# Patient Record
Sex: FEMALE | Race: WHITE | NOT HISPANIC OR LATINO | Employment: FULL TIME | URBAN - METROPOLITAN AREA
[De-identification: names, ages, dates, MRNs, and addresses within clinical notes are randomized per-mention and may not be internally consistent; named-entity substitution may affect disease eponyms.]

---

## 2019-03-20 ENCOUNTER — OFFICE VISIT (OUTPATIENT)
Dept: FAMILY MEDICINE CLINIC | Facility: CLINIC | Age: 34
End: 2019-03-20
Payer: COMMERCIAL

## 2019-03-20 VITALS
WEIGHT: 125.4 LBS | HEART RATE: 88 BPM | HEIGHT: 61 IN | DIASTOLIC BLOOD PRESSURE: 76 MMHG | BODY MASS INDEX: 23.68 KG/M2 | SYSTOLIC BLOOD PRESSURE: 128 MMHG

## 2019-03-20 DIAGNOSIS — M79.10 MYALGIA: ICD-10-CM

## 2019-03-20 DIAGNOSIS — M54.2 NECK PAIN: Primary | ICD-10-CM

## 2019-03-20 DIAGNOSIS — M25.50 ARTHRALGIA, UNSPECIFIED JOINT: ICD-10-CM

## 2019-03-20 DIAGNOSIS — R22.1 NECK MASS: ICD-10-CM

## 2019-03-20 DIAGNOSIS — Z30.41 ORAL CONTRACEPTIVE USE: ICD-10-CM

## 2019-03-20 DIAGNOSIS — Z00.00 HEALTH CARE MAINTENANCE: ICD-10-CM

## 2019-03-20 DIAGNOSIS — Z13.220 SCREENING FOR LIPID DISORDERS: ICD-10-CM

## 2019-03-20 PROCEDURE — 1036F TOBACCO NON-USER: CPT | Performed by: FAMILY MEDICINE

## 2019-03-20 PROCEDURE — 99204 OFFICE O/P NEW MOD 45 MIN: CPT | Performed by: FAMILY MEDICINE

## 2019-03-20 PROCEDURE — 3008F BODY MASS INDEX DOCD: CPT | Performed by: FAMILY MEDICINE

## 2019-03-20 RX ORDER — NORGESTIMATE AND ETHINYL ESTRADIOL 7DAYSX3 28
1 KIT ORAL
COMMUNITY
Start: 2019-01-14

## 2019-03-20 NOTE — PROGRESS NOTES
Assessment and Plan:  1  Neck pain 2  Neck mass, I favor this feels like the normal C7 prominence, x-rays ordered physical therapy is ordered patient use Aleve 1 twice a day and use ice 15 minutes 3 to 4 times a day if needed  3  Myalgias/arthralgias, blood work is ordered  4  Oral contraceptive use patient is currently on oral contraceptive  5  Health maintenance/lipid screening, blood work is ordered  6  Patient to return in 2 weeks, sooner if needed           Problem List Items Addressed This Visit        Other    Neck pain - Primary     X-ray, therapy is ordered, patient may use Aleve twice a day           Relevant Orders    XR spine cervical complete 4 or 5 vw non injury    Ambulatory referral to Physical Therapy    CBC    Comprehensive metabolic panel    TSH, 3rd generation with Free T4 reflex    Urinalysis with reflex to microscopic    Lipid Panel with Direct LDL reflex    LEV Screen w/ Reflex to Titer/Pattern    CK    Lyme Antibody Profile with reflex to WB    RF Screen w/ Reflex to Titer    Sedimentation rate, automated    Parvovirus B19 antibody, IgG and IgM    Vitamin D 25 hydroxy    Myalgia     Treatment as above blood work is ordered         Relevant Orders    Ambulatory referral to Physical Therapy    CBC    Comprehensive metabolic panel    TSH, 3rd generation with Free T4 reflex    Urinalysis with reflex to microscopic    Lipid Panel with Direct LDL reflex    LEV Screen w/ Reflex to Titer/Pattern    CK    Lyme Antibody Profile with reflex to WB    RF Screen w/ Reflex to Titer    Sedimentation rate, automated    Parvovirus B19 antibody, IgG and IgM    Vitamin D 25 hydroxy    Health care maintenance     Blood work is ordered         Relevant Orders    CBC    Comprehensive metabolic panel    TSH, 3rd generation with Free T4 reflex    Urinalysis with reflex to microscopic    Lipid Panel with Direct LDL reflex    LEV Screen w/ Reflex to Titer/Pattern    CK    Lyme Antibody Profile with reflex to WB    RF Screen w/ Reflex to Titer    Sedimentation rate, automated    Parvovirus B19 antibody, IgG and IgM    Vitamin D 25 hydroxy    Oral contraceptive use     Patient currently on oral contraceptive         Relevant Orders    CBC    Comprehensive metabolic panel    TSH, 3rd generation with Free T4 reflex    Urinalysis with reflex to microscopic    Lipid Panel with Direct LDL reflex    LEV Screen w/ Reflex to Titer/Pattern    CK    Lyme Antibody Profile with reflex to WB    RF Screen w/ Reflex to Titer    Sedimentation rate, automated    Parvovirus B19 antibody, IgG and IgM    Vitamin D 25 hydroxy    Neck mass     I feel this is a physiologic C7 prominence x-ray is ordered         Relevant Orders    XR spine cervical complete 4 or 5 vw non injury    Ambulatory referral to Physical Therapy    CBC    Comprehensive metabolic panel    TSH, 3rd generation with Free T4 reflex    Urinalysis with reflex to microscopic    Lipid Panel with Direct LDL reflex    LEV Screen w/ Reflex to Titer/Pattern    CK    Lyme Antibody Profile with reflex to WB    RF Screen w/ Reflex to Titer    Sedimentation rate, automated    Parvovirus B19 antibody, IgG and IgM    Vitamin D 25 hydroxy      Other Visit Diagnoses     Arthralgia, unspecified joint        Relevant Orders    Ambulatory referral to Physical Therapy    CBC    Comprehensive metabolic panel    TSH, 3rd generation with Free T4 reflex    Urinalysis with reflex to microscopic    Lipid Panel with Direct LDL reflex    LEV Screen w/ Reflex to Titer/Pattern    CK    Lyme Antibody Profile with reflex to WB    RF Screen w/ Reflex to Titer    Sedimentation rate, automated    Parvovirus B19 antibody, IgG and IgM    Vitamin D 25 hydroxy    Screening for lipid disorders        Relevant Orders    CBC    Comprehensive metabolic panel    TSH, 3rd generation with Free T4 reflex    Urinalysis with reflex to microscopic    Lipid Panel with Direct LDL reflex    LEV Screen w/ Reflex to Titer/Pattern    CK Lyme Antibody Profile with reflex to WB    RF Screen w/ Reflex to Titer    Sedimentation rate, automated    Parvovirus B19 antibody, IgG and IgM    Vitamin D 25 hydroxy                 Diagnoses and all orders for this visit:    Neck pain  -     XR spine cervical complete 4 or 5 vw non injury; Future  -     Ambulatory referral to Physical Therapy; Future  -     CBC; Future  -     Comprehensive metabolic panel; Future  -     TSH, 3rd generation with Free T4 reflex; Future  -     Urinalysis with reflex to microscopic; Future  -     Lipid Panel with Direct LDL reflex; Future  -     LEV Screen w/ Reflex to Titer/Pattern; Future  -     CK; Future  -     Lyme Antibody Profile with reflex to WB; Future  -     RF Screen w/ Reflex to Titer; Future  -     Sedimentation rate, automated; Future  -     Parvovirus B19 antibody, IgG and IgM; Future  -     Vitamin D 25 hydroxy; Future    Myalgia  -     Ambulatory referral to Physical Therapy; Future  -     CBC; Future  -     Comprehensive metabolic panel; Future  -     TSH, 3rd generation with Free T4 reflex; Future  -     Urinalysis with reflex to microscopic; Future  -     Lipid Panel with Direct LDL reflex; Future  -     LEV Screen w/ Reflex to Titer/Pattern; Future  -     CK; Future  -     Lyme Antibody Profile with reflex to WB; Future  -     RF Screen w/ Reflex to Titer; Future  -     Sedimentation rate, automated; Future  -     Parvovirus B19 antibody, IgG and IgM; Future  -     Vitamin D 25 hydroxy; Future    Health care maintenance  -     CBC; Future  -     Comprehensive metabolic panel; Future  -     TSH, 3rd generation with Free T4 reflex; Future  -     Urinalysis with reflex to microscopic; Future  -     Lipid Panel with Direct LDL reflex; Future  -     LEV Screen w/ Reflex to Titer/Pattern; Future  -     CK; Future  -     Lyme Antibody Profile with reflex to WB; Future  -     RF Screen w/ Reflex to Titer; Future  -     Sedimentation rate, automated;  Future  - Parvovirus B19 antibody, IgG and IgM; Future  -     Vitamin D 25 hydroxy; Future    Oral contraceptive use  -     CBC; Future  -     Comprehensive metabolic panel; Future  -     TSH, 3rd generation with Free T4 reflex; Future  -     Urinalysis with reflex to microscopic; Future  -     Lipid Panel with Direct LDL reflex; Future  -     LEV Screen w/ Reflex to Titer/Pattern; Future  -     CK; Future  -     Lyme Antibody Profile with reflex to WB; Future  -     RF Screen w/ Reflex to Titer; Future  -     Sedimentation rate, automated; Future  -     Parvovirus B19 antibody, IgG and IgM; Future  -     Vitamin D 25 hydroxy; Future    Arthralgia, unspecified joint  -     Ambulatory referral to Physical Therapy; Future  -     CBC; Future  -     Comprehensive metabolic panel; Future  -     TSH, 3rd generation with Free T4 reflex; Future  -     Urinalysis with reflex to microscopic; Future  -     Lipid Panel with Direct LDL reflex; Future  -     LEV Screen w/ Reflex to Titer/Pattern; Future  -     CK; Future  -     Lyme Antibody Profile with reflex to WB; Future  -     RF Screen w/ Reflex to Titer; Future  -     Sedimentation rate, automated; Future  -     Parvovirus B19 antibody, IgG and IgM; Future  -     Vitamin D 25 hydroxy; Future    Screening for lipid disorders  -     CBC; Future  -     Comprehensive metabolic panel; Future  -     TSH, 3rd generation with Free T4 reflex; Future  -     Urinalysis with reflex to microscopic; Future  -     Lipid Panel with Direct LDL reflex; Future  -     LEV Screen w/ Reflex to Titer/Pattern; Future  -     CK; Future  -     Lyme Antibody Profile with reflex to WB; Future  -     RF Screen w/ Reflex to Titer; Future  -     Sedimentation rate, automated; Future  -     Parvovirus B19 antibody, IgG and IgM; Future  -     Vitamin D 25 hydroxy; Future    Neck mass  -     XR spine cervical complete 4 or 5 vw non injury; Future  -     Ambulatory referral to Physical Therapy;  Future  -     CBC; Future  -     Comprehensive metabolic panel; Future  -     TSH, 3rd generation with Free T4 reflex; Future  -     Urinalysis with reflex to microscopic; Future  -     Lipid Panel with Direct LDL reflex; Future  -     LEV Screen w/ Reflex to Titer/Pattern; Future  -     CK; Future  -     Lyme Antibody Profile with reflex to WB; Future  -     RF Screen w/ Reflex to Titer; Future  -     Sedimentation rate, automated; Future  -     Parvovirus B19 antibody, IgG and IgM; Future  -     Vitamin D 25 hydroxy; Future              Subjective:      Patient ID: Ike Sam is a 29 y o  female  CC:    Chief Complaint   [facility]   [facility]      pt is complaining of a pain at the base of her neck~cd       HPI:    Over the past few months patient has her increasing neck pain and a lump at the base of her neck  Patient also with myalgias and arthralgias in the neck and the upper back at times  Occurs approximately once a week  Lasting a few hours  Not acute no medication has been used up to now  No history of connective tissue disease no history of tick bite fever or chills  The following portions of the patient's history were reviewed and updated as appropriate: allergies, current medications, past family history, past medical history, past social history, past surgical history and problem list       Review of Systems   Constitutional:        HPI   HENT: Negative  Eyes: Negative  Respiratory: Negative  Cardiovascular: Negative  Gastrointestinal: Negative  Endocrine: Negative  Genitourinary: Negative  Musculoskeletal:        HPI   Skin:        HPI   Allergic/Immunologic: Negative  Neurological: Negative  Hematological: Negative            Data to review:       Objective:    Vitals:    03/20/19 1616   BP: 128/76   BP Location: Left arm   Patient Position: Sitting   Cuff Size: Standard   Pulse: 88   Weight: 56 9 kg (125 lb 6 4 oz)   Height: 5' 1" (1 549 m) Physical Exam   Constitutional: She appears well-developed and well-nourished  HENT:   Head: Normocephalic and atraumatic  Right Ear: External ear normal    Left Ear: External ear normal    Nose: Nose normal    Mouth/Throat: Oropharynx is clear and moist  No oropharyngeal exudate  Eyes: Pupils are equal, round, and reactive to light  Conjunctivae and EOM are normal  No scleral icterus  Neck: Neck supple  No JVD present  No tracheal deviation present  No thyromegaly present  Cardiovascular: Normal rate, regular rhythm and normal heart sounds  Pulmonary/Chest: Effort normal and breath sounds normal    Abdominal: Soft  Bowel sounds are normal  There is no tenderness  Musculoskeletal: She exhibits no edema, tenderness or deformity  Cervical spine with mild paravertebral muscle contraction negative point tenderness mild prominence of the C7 area  Lymphadenopathy:     She has no cervical adenopathy  Skin: Skin is warm and dry  Psychiatric: She has a normal mood and affect

## 2019-03-20 NOTE — PATIENT INSTRUCTIONS
Complete x-rays and blood work as ordered  Follow-up physical therapy  Patient take Aleve 1 tablet twice daily with food if needed for pain  Patient use ice 15 minutes 3 to 4 times a day to the neck area if needed    Recheck 2 weeks